# Patient Record
Sex: FEMALE | Race: WHITE | NOT HISPANIC OR LATINO | Employment: FULL TIME | URBAN - METROPOLITAN AREA
[De-identification: names, ages, dates, MRNs, and addresses within clinical notes are randomized per-mention and may not be internally consistent; named-entity substitution may affect disease eponyms.]

---
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
## 2019-09-30 ENCOUNTER — APPOINTMENT (OUTPATIENT)
Dept: LAB | Facility: MEDICAL CENTER | Age: 19
End: 2019-09-30
Payer: COMMERCIAL

## 2019-09-30 ENCOUNTER — OFFICE VISIT (OUTPATIENT)
Dept: OBGYN CLINIC | Facility: MEDICAL CENTER | Age: 19
End: 2019-09-30
Payer: COMMERCIAL

## 2019-09-30 VITALS
HEIGHT: 63 IN | BODY MASS INDEX: 37.74 KG/M2 | DIASTOLIC BLOOD PRESSURE: 70 MMHG | WEIGHT: 213 LBS | SYSTOLIC BLOOD PRESSURE: 118 MMHG

## 2019-09-30 DIAGNOSIS — N91.2 AMENORRHEA: ICD-10-CM

## 2019-09-30 DIAGNOSIS — N91.2 AMENORRHEA: Primary | ICD-10-CM

## 2019-09-30 DIAGNOSIS — N91.2 ABSENT MENSES: ICD-10-CM

## 2019-09-30 LAB
SL AMB POCT URINE HCG: NEGATIVE
TSH SERPL DL<=0.05 MIU/L-ACNC: 2.76 UIU/ML (ref 0.46–3.98)

## 2019-09-30 PROCEDURE — 84443 ASSAY THYROID STIM HORMONE: CPT

## 2019-09-30 PROCEDURE — 99214 OFFICE O/P EST MOD 30 MIN: CPT | Performed by: NURSE PRACTITIONER

## 2019-09-30 PROCEDURE — 81025 URINE PREGNANCY TEST: CPT | Performed by: NURSE PRACTITIONER

## 2019-09-30 PROCEDURE — 36415 COLL VENOUS BLD VENIPUNCTURE: CPT

## 2019-09-30 NOTE — PROGRESS NOTES
Assessment:  22 y o  Helen Martinez presenting with c/o amenorrhea since July  Plan:  Sent for pelvic ultrasound will contact her with results             Sent to lab TSH to be drawn today  Given list of [facility] PCPs for her to establish care with  Written and verbal information on Nexplanon given to her patient would like to use this method  Will contact her insurance to check prior authorization and              call her with the appointment for insertion  UPT was negative in the office today  25 minutes was spent with pt of which >50% was counseling and coordination of care  Diagnoses and all orders for this visit:    Amenorrhea  -     US pelvis complete w transvaginal; Future  -     TSH, 3rd generation with Free T4 reflex; Future    Absent menses  -     POCT urine HCG            __________________________________________________________________    Subjective   Jaelyn Field is a 22 y o  Helen Martinez with irregular menses who is sexually active and currently using condoms as bcm  States since April she has been getting her cycles every other month, her last period was in July and she has not had any bleeding since  She has had a negative home UPT test is not trying to get pregnant but is "okay if she does become pregnant"        Patient reports she has been dealing with this problem for  Several months    She notes her heaviest flow lasts four days, during which time she uses a regular tampons every 4 hours  She denies dysmenorrhea that occurs premenstrually and throughout menses  She denies intermenstrual spotting  She denies a hx of easy bruising, , or significant bleeding with surgical/dental procedures  Patient denies any complaints of excessive facial of body here or any signs of androgen excess  States that she has gained approximately 40 lb over a few months because 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    She is aware that she needs to lose weight and is looking into joining a gym  She is not interested in any referrals to weight management at this time  She also would like to discuss changing to a more permanent form of birth control  She has used NuvaRing in the past without issues, she used Depo but gained weight when she was on it  She is interested in discussing the Nexplanon and IUD  The following portions of the patient's history were reviewed and updated as appropriate: current medications, past family history, past medical history, past social history, past surgical history and problem list     Review of Systems  Pertinent items are noted in HPI         Objective  /70   Ht 5' 2 5" (1 588 m)   Wt 96 6 kg (213 lb)   LMP 07/01/2019 (Approximate)   BMI 38 34 kg/m²      Physical Exam:  General:   appears stated age, cooperative, alert normal mood and affect   Neck: Neck: normal, supple,trachea midline, no masses   Heart: regular rate and rhythm, S1, S2 normal, no murmur, click, rub or gallop   Lungs: clear to auscultation bilaterally                                     Lab Review  Labs: TSH     Imaging  Ultrasound - Pelvic Vaginal

## 2019-10-04 ENCOUNTER — TELEPHONE (OUTPATIENT)
Dept: OBGYN CLINIC | Facility: MEDICAL CENTER | Age: 19
End: 2019-10-04

## 2019-10-04 NOTE — TELEPHONE ENCOUNTER
----- Message from Beryl Hickman sent at 10/4/2019  8:29 AM EDT -----  Pt aware  appt scheduled   ----- Message -----  From: Beryl Hickman  Sent: 10/3/2019   8:22 AM EDT  To: Beryl Hickman    I contacted pt's insurance  Effective 7/1/19  Pt is covered for insertion, removal and device at 100%  No Pa needed  Darron Yang 10/3/19 at 6187  I tried contacting pt, but phone stating she is not accepting phone calls  Will try again later   ----- Message -----  From: SIOBHAN Calderon  Sent: 9/30/2019   9:09 AM EDT  To: Oneita Min,    Can you please check prior auth for nexplanon for this patient  And call her with apt for insertion

## 2024-06-12 ENCOUNTER — OFFICE VISIT (OUTPATIENT)
Dept: OBGYN CLINIC | Facility: CLINIC | Age: 24
End: 2024-06-12
Payer: COMMERCIAL

## 2024-06-12 VITALS
WEIGHT: 208 LBS | BODY MASS INDEX: 36.86 KG/M2 | SYSTOLIC BLOOD PRESSURE: 120 MMHG | HEIGHT: 63 IN | DIASTOLIC BLOOD PRESSURE: 60 MMHG

## 2024-06-12 DIAGNOSIS — Z01.419 ENCNTR FOR GYN EXAM (GENERAL) (ROUTINE) W/O ABN FINDINGS: Primary | ICD-10-CM

## 2024-06-12 DIAGNOSIS — Z11.3 SCREEN FOR STD (SEXUALLY TRANSMITTED DISEASE): ICD-10-CM

## 2024-06-12 DIAGNOSIS — N92.6 IRREGULAR MENSES: ICD-10-CM

## 2024-06-12 LAB — SL AMB POCT URINE HCG: NORMAL

## 2024-06-12 PROCEDURE — G0145 SCR C/V CYTO,THINLAYER,RESCR: HCPCS | Performed by: PHYSICIAN ASSISTANT

## 2024-06-12 PROCEDURE — 87661 TRICHOMONAS VAGINALIS AMPLIF: CPT | Performed by: PHYSICIAN ASSISTANT

## 2024-06-12 PROCEDURE — 87591 N.GONORRHOEAE DNA AMP PROB: CPT | Performed by: PHYSICIAN ASSISTANT

## 2024-06-12 PROCEDURE — S0612 ANNUAL GYNECOLOGICAL EXAMINA: HCPCS | Performed by: PHYSICIAN ASSISTANT

## 2024-06-12 PROCEDURE — 87491 CHLMYD TRACH DNA AMP PROBE: CPT | Performed by: PHYSICIAN ASSISTANT

## 2024-06-12 PROCEDURE — 81025 URINE PREGNANCY TEST: CPT | Performed by: PHYSICIAN ASSISTANT

## 2024-06-12 NOTE — PROGRESS NOTES
Assessment   23 y.o.  presenting for annual exam.     Plan   Diagnoses and all orders for this visit:    Encntr for gyn exam (general) (routine) w/o abn findings    Screen for STD (sexually transmitted disease)  -     HIV 1/2 AG/AB w Reflex [facility] for 2 yr old and above; Future  -     RPR-Syphilis Screening (Total Syphilis IGG/IGM); Future  -     Hepatitis B surface antigen; Future  -     Hepatitis C antibody; Future    Irregular menses  -     TSH, 3rd generation with Free T4 reflex; Future  -     Prolactin; Future  -     DHEA-sulfate; Future  -     17-Hydroxyprogesterone; Future  -     Testosterone; Future  -     Insulin, fasting; Future  -     Comprehensive metabolic panel; Future        Pap collected   STI cultures obtained; blood work ordered  Irregular menses - lab work obtained; follow up accordingly. Pt plans to work on diet and exercise. Not interested in a birth control method at this time. Happy with condom use     Perineal hygiene reviewed. Weight bearing exercises minium of 150 mins/weekly advised. Kegel exercises recommended.   SBE encouraged, A yearly mammogram is recommended for breast cancer screening starting at age 40. ASCCP guidelines reviewed. Condoms encouraged with all sexual activity to prevent STI's. Gardisil vaccines recommended up to age 45. Calcium/ Vit D dietary requirements discussed.   Advised to call with any issues, all concerns & questions addressed.   See provided information in your after visit summary     F/U Annually and PRN    Results will be released to [facility], if abnormal will call or message to review and discuss treatment plan.     __________________________________________________________________    Subjective     Jaelyn Day is a 23 y.o.  presenting for annual exam. She is without complaint and does want STD testing today.     About 3-4 months ago menses started to change. Present every 6 weeks instead of 4 weeks. Duration 4 days (unchanged). Flow is  moderate. She does note significant weight gain over the past several months and wonders if this is contributing. She is sexually active. Using condoms reliably. Used pills and depo in the past with significant weight gain- not interested in restarting    SCREENING  Last Pap: never      GYN    Sexually active: Yes - single partner - male  Contraception: condoms   Hx STI: hx of chlamydia 2 year agos     Hx Abnormal pap: n/a  We reviewed ASCCP guidelines for Pap testing today.  Gardasil: She has completed the Gardasil series.    Denies vaginal discharge, itching, odor, dyspareunia, pelvic pain and vulvar/vaginal symptoms      OB           Complaints: denies   Denies urgency, frequency, hematuria, leakage / change in stream, difficulty urinating.       BREAST  Complaints: denies   Denies: breast lump, breast tenderness, nipple discharge, skin color change, and skin lesion(s)  Personal hx: none      Pertinent Family Hx:   Family hx of breast cancer: no  Family hx of ovarian cancer: no  Family hx of colon cancer: no      GENERAL  PMH reviewed/updated and is as below.   Patient does not follow with a PCP.    SOCIAL  Smoking: no  Alcohol: social  Drug: THC daily  Occupation: at [employer]       History reviewed. No pertinent past medical history.    Past Surgical History:   Procedure Laterality Date    WISDOM TOOTH EXTRACTION         No current outpatient medications on file.    No Known Allergies    Social History     Socioeconomic History    Marital status: Single     Spouse name: Not on file    Number of children: Not on file    Years of education: Not on file    Highest education level: Not on file   Occupational History    Not on file   Tobacco Use    Smoking status: Never    Smokeless tobacco: Never   Substance and Sexual Activity    Alcohol use: Yes     Alcohol/week: 3.0 standard drinks of alcohol     Types: 3 Shots of liquor per week    Drug use: Never    Sexual activity: Yes     Partners: Male      "Birth control/protection: Coitus interruptus, Condom Male   Other Topics Concern    Not on file   Social History Narrative    Not on file     Social Determinants of Health     Financial Resource Strain: Not on file   Food Insecurity: Not on file   Transportation Needs: Not on file   Physical Activity: Not on file   Stress: Not on file   Social Connections: Not on file   Intimate Partner Violence: Not on file   Housing Stability: Not on file       Review of Systems     ROS:  Constitutional: Negative for fatigue and unexpected weight change.   Respiratory: Negative for cough and shortness of breath.    Cardiovascular: Negative for chest pain and palpitations.   Gastrointestinal: Negative for abdominal pain and change in bowel habits  Breasts:  Negative, other than as noted above.   Genitourinary: Negative, other than as noted above.   Psychiatric: Negative for mood difficulties.      Objective      /60 (BP Location: Right arm, Patient Position: Sitting, Cuff Size: Standard)   Ht 5' 2.99\" (1.6 m)   Wt 94.3 kg (208 lb)   LMP 05/12/2024 (Exact Date)   BMI 36.85 kg/m²     Physical Examination:    Patient appears well and is not in distress  Neck is supple without masses, no cervical or supraclavicular lymphadenopathy  Cardiovascular: regular rate and rhythm; no murmurs  Lungs: clear to auscultation bilaterally; no wheezes  Breasts are symmetrical without mass, tenderness, nipple discharge, skin changes or adenopathy.   Abdomen is soft and nontender without masses.   External genitals are normal without lesions or rashes.  Urethral meatus and urethra are normal  Bladder is normal to palpation  Vagina is normal without discharge or bleeding.   Cervix is normal without discharge or lesion.   Uterus is normal, mobile, nontender without palpable mass.  Adnexa are normal, nontender, without palpable mass.                 "

## 2024-06-14 LAB
C TRACH DNA SPEC QL NAA+PROBE: NEGATIVE
N GONORRHOEA DNA SPEC QL NAA+PROBE: NEGATIVE
T VAGINALIS DNA SPEC QL NAA+PROBE: NEGATIVE

## 2024-06-19 LAB
LAB AP GYN PRIMARY INTERPRETATION: NORMAL
Lab: NORMAL